# Patient Record
Sex: MALE | URBAN - METROPOLITAN AREA
[De-identification: names, ages, dates, MRNs, and addresses within clinical notes are randomized per-mention and may not be internally consistent; named-entity substitution may affect disease eponyms.]

---

## 2021-08-03 ENCOUNTER — OFFICE VISIT (OUTPATIENT)
Dept: URBAN - METROPOLITAN AREA CLINIC 6 | Facility: CLINIC | Age: 81
End: 2021-08-03
Payer: MEDICARE

## 2021-08-03 DIAGNOSIS — H35.013 CHANGES IN RETINAL VASCULAR APPEARANCE, BILATERAL: ICD-10-CM

## 2021-08-03 DIAGNOSIS — H43.391 OTHER VITREOUS OPACITIES, RIGHT EYE: ICD-10-CM

## 2021-08-03 DIAGNOSIS — H25.812 COMBINED FORMS OF AGE-RELATED CATARACT, LEFT EYE: ICD-10-CM

## 2021-08-03 DIAGNOSIS — H02.831 DERMATOCHALASIS OF RIGHT UPPER EYELID: ICD-10-CM

## 2021-08-03 DIAGNOSIS — H11.153 PINGUECULA, BILATERAL: ICD-10-CM

## 2021-08-03 DIAGNOSIS — H02.834 DERMATOCHALASIS OF LEFT UPPER EYELID: ICD-10-CM

## 2021-08-03 PROCEDURE — 92250 FUNDUS PHOTOGRAPHY W/I&R: CPT | Performed by: OPTOMETRIST

## 2021-08-03 PROCEDURE — 92004 COMPRE OPH EXAM NEW PT 1/>: CPT | Performed by: OPTOMETRIST

## 2021-08-03 ASSESSMENT — INTRAOCULAR PRESSURE
OD: 15
OS: 15

## 2021-08-03 NOTE — IMPRESSION/PLAN
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 Plan: Recommend strict control of HTN, blood cholesterol levels and blood sugar levels. Recommend urgent follow-up with PCP, despite recent blood work.

## 2021-08-03 NOTE — IMPRESSION/PLAN
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 Plan: Patient has engorged/dilated central retinal veins OU as well.

## 2021-08-03 NOTE — IMPRESSION/PLAN
Impression: Other vitreous opacities, right eye: H43.391. Plan: Subjective floaters, both eyes- no obvious retinal pathology, patient advised to contact us immediate for any visual signs or symptoms, including, but not limited to, new floaters, flashing lights, and loss of peripheral vision.

## 2021-08-03 NOTE — IMPRESSION/PLAN
Impression: Retinal hemorrhage, bilateral: H35.63. Plan: Multiple retinal hemorrhages seen OU (H35.63): Differential diagnosis includes idiopathic, HTN, and DM. Advised patient to check with PCP. Per patient, no DM. Diffuse dot blot hemorrhages OD>OS would strongly indicate DM vs. reduced ocular blood flow/ischemia of the retina.

## 2021-08-03 NOTE — IMPRESSION/PLAN
Impression: Combined forms of age-related cataract, left eye: H25.812. Plan: Cataract OS (H25.813): Observe for now without intervention. The patient was advised to contact us if any change or worsening of vision.

## 2021-08-03 NOTE — IMPRESSION/PLAN
Impression: Dermatochalasis of left upper eyelid: H02.834. Plan: Dermatochalasis bilateral UL's (A45.167, [id number])- Patient is not symptomatic. Explained how it may become visually significant. The patient is advised to contact us for any change or obstruction of vision.

## 2021-08-03 NOTE — IMPRESSION/PLAN
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 Plan: Recommend patient follow-up urgently with PCP to consider carotid ultrasound as well as EKG and echocardiogram IF blood sugar levels are within normal limits.

## 2021-11-05 ENCOUNTER — OFFICE VISIT (OUTPATIENT)
Dept: URBAN - METROPOLITAN AREA CLINIC 6 | Facility: CLINIC | Age: 81
End: 2021-11-05
Payer: MEDICARE

## 2021-11-05 DIAGNOSIS — H35.63 RETINAL HEMORRHAGE, BILATERAL: ICD-10-CM

## 2021-11-05 PROCEDURE — 92004 COMPRE OPH EXAM NEW PT 1/>: CPT | Performed by: OPHTHALMOLOGY

## 2021-11-05 PROCEDURE — 92250 FUNDUS PHOTOGRAPHY W/I&R: CPT | Performed by: OPHTHALMOLOGY

## 2021-11-05 ASSESSMENT — INTRAOCULAR PRESSURE
OS: 16
OD: 14

## 2021-11-05 NOTE — IMPRESSION/PLAN
Impression: Retinal hemorrhage, bilateral: H35.63. Plan: likely secondary to anemia. Was told to start iron therapy. Stressed importance of following with PCP to check: BP/Lipid profile/A1c/ CBC. RTC 3 mo for f/u. Will defer cataract surgery OS till retina condition improves.

## 2021-11-05 NOTE — IMPRESSION/PLAN
Impression: Combined forms of age-related cataract, left eye: H25.812. Plan: Moderate cataract: Appears to be visually significant and cataract extraction was offered to the patient, however, patient prefers to observe for now without intervention. The patient was advised to contact us if any change or worsening of vision.